# Patient Record
Sex: MALE
[De-identification: names, ages, dates, MRNs, and addresses within clinical notes are randomized per-mention and may not be internally consistent; named-entity substitution may affect disease eponyms.]

---

## 2024-07-25 ENCOUNTER — APPOINTMENT (OUTPATIENT)
Dept: ORTHOPEDIC SURGERY | Facility: CLINIC | Age: 59
End: 2024-07-25
Payer: COMMERCIAL

## 2024-07-25 VITALS — WEIGHT: 180 LBS | HEIGHT: 67 IN | BODY MASS INDEX: 28.25 KG/M2

## 2024-07-25 DIAGNOSIS — M75.32 CALCIFIC TENDINITIS OF LEFT SHOULDER: ICD-10-CM

## 2024-07-25 DIAGNOSIS — E11.9 TYPE 2 DIABETES MELLITUS W/OUT COMPLICATIONS: ICD-10-CM

## 2024-07-25 DIAGNOSIS — M79.18 MYALGIA, OTHER SITE: ICD-10-CM

## 2024-07-25 PROBLEM — Z00.00 ENCOUNTER FOR PREVENTIVE HEALTH EXAMINATION: Status: ACTIVE | Noted: 2024-07-25

## 2024-07-25 PROCEDURE — 20611 DRAIN/INJ JOINT/BURSA W/US: CPT | Mod: LT

## 2024-07-25 PROCEDURE — 72040 X-RAY EXAM NECK SPINE 2-3 VW: CPT

## 2024-07-25 PROCEDURE — 73010 X-RAY EXAM OF SHOULDER BLADE: CPT | Mod: LT

## 2024-07-25 PROCEDURE — 99204 OFFICE O/P NEW MOD 45 MIN: CPT | Mod: 25

## 2024-07-25 PROCEDURE — J3490M: CUSTOM

## 2024-07-25 PROCEDURE — 73030 X-RAY EXAM OF SHOULDER: CPT | Mod: LT

## 2024-07-25 RX ORDER — NAPROXEN 500 MG/1
500 TABLET ORAL TWICE DAILY
Qty: 30 | Refills: 0 | Status: ACTIVE | OUTPATIENT
Start: 2024-07-25

## 2024-07-25 NOTE — HISTORY OF PRESENT ILLNESS
[de-identified] : 58 year old male  (VENICE,  at [facility])  chronic neck into left shoulder pain since 2016 worsening since 2024 The pain is located anterior, lateral shoulder and side of neck into arm The pain is associated with radiating, numbness, tingling, clicking   Worse with activity and better at rest. Has tried ibuprofen, activity mod PMHX DM
DC instructions

## 2024-07-25 NOTE — ASSESSMENT
[FreeTextEntry1] : Left X-Ray Examination of the SHOULDER (2 views):  no fractures, subluxations or dislocations. Calcific density seen X-Ray Examination of the SCAPULA 1 or 2 views shows: no significant abnormalities X-Ray Examination of the CERVICAL SPINE 3 views (or less) shows: straightening consistent with spasm and disc space narrowing.    Cerv Disc Disease with Radic / Radiation into upper extremity, pos spurling on exam and weakness  - Pertinent aspects of the natural history of calcific tendonitis were reviewed with the patient.  I also reviewed with the patient that this condition is often associated with diabetes mellitus and thyroid disorders. - We discussed their diagnosis and treatment options at length including the risks and benefits of both surgical and non-surgical options. Surgical risks include but are not limited to pain, infection, bleeding, vascular injury, numbness, tingling, nerve damage. - Due to risks of surgery, they will continue conservative treatment with PT and anti-inflammatory medications. - naprosyn rx - Patient was given a prescription for an anti-inflammatory medication.  They will take it for the next week and then on an as needed basis, as long as there are no medical contra-indications.  Patient is counseled on possible GI, renal, and cardiovascular side effects. - We also discussed the possible of a Left shoulder corticosteroid injection in order to help decrease inflammation and pain so that they can perform better therapy and they wished to proceed with this treatment course. - MRI to rule out HNP and other causes of cervical radiculopathy - We also discussed risks and possibility of epidural injection by one of our pain mgmt doctors in the future - Follow up with pain management after mri

## 2024-07-25 NOTE — IMAGING
[de-identified] : NECK: Inspection: no ecchymosis.  Palpation: trapezial tenderness.  Range of motion:  Full range of motion with mild stiffness . Pain at extremes of rotation to left.  Strength Testing: Weakness with Left Finger Abductors and Grasp Normal Deltoid, Biceps, Triceps, Wrist Flexors Neurological testing: light touch is intact throughout both upper extremities Gonzalez reflex: neg Spurling test: positive   LEFT SHOULDER Inspection: No swelling.  Palpation: Tenderness is noted at the bicipital groove, anterior and lateral.  Range of motion: There is pain with range of motion. , ER 55, @90ER 90, @90IR 30 Strength: There is pain and discomfort with strength testing. Forward Flexion 4/5. Abduction 4/5.  External Rotation 5-/5 and Internal Rotation 5/5  Neurological testings: motor and sensor intact distally. Ligament Stability and Special Tests:  There is positive arc of pain.  Shoulder apprehension: neg Shoulder relocation: neg Obriens test: pos Biceps Active test: neg Bates Labral Shear: neg Impingement testing: pos Beatriz testing: pos Whipple: pos Cross Body Adduction: neg

## 2024-07-26 ENCOUNTER — APPOINTMENT (OUTPATIENT)
Dept: MRI IMAGING | Facility: CLINIC | Age: 59
End: 2024-07-26
Payer: COMMERCIAL

## 2024-07-26 PROCEDURE — 72141 MRI NECK SPINE W/O DYE: CPT

## 2024-08-01 PROBLEM — M50.90 CERVICAL DISC DISEASE: Status: ACTIVE | Noted: 2024-07-25

## 2024-08-01 PROBLEM — M54.12 CERVICAL RADICULOPATHY: Status: ACTIVE | Noted: 2024-07-25

## 2024-08-01 NOTE — IMAGING
[de-identified] : NECK: Inspection: no ecchymosis.  Palpation: trapezial tenderness.  Range of motion:  Full range of motion with mild stiffness . Pain at extremes of rotation to left.  Strength Testing: Weakness with Left Finger Abductors and Grasp Normal Deltoid, Biceps, Triceps, Wrist Flexors Neurological testing: light touch is intact throughout both upper extremities Gonzalez reflex: neg Spurling test: positive   LEFT SHOULDER Inspection: No swelling.  Palpation: Tenderness is noted at the bicipital groove, anterior and lateral.  Range of motion: There is pain with range of motion. , ER 55, @90ER 90, @90IR 30 Strength: There is pain and discomfort with strength testing. Forward Flexion 4/5. Abduction 4/5.  External Rotation 5-/5 and Internal Rotation 5/5  Neurological testings: motor and sensor intact distally. Ligament Stability and Special Tests:  There is positive arc of pain.  Shoulder apprehension: neg Shoulder relocation: neg Obriens test: pos Biceps Active test: neg Bates Labral Shear: neg Impingement testing: pos Beatriz testing: pos Whipple: pos Cross Body Adduction: neg

## 2024-08-01 NOTE — ASSESSMENT
[FreeTextEntry1] :  mri c spine 7/26/24 - multilevel DD with mult protrusions and stenosis and L disc extrusion c5-6 with severe stenosis   Cerv Disc Disease with Radic / Radiation into upper extremity,   - We discussed their diagnosis and treatment options at length including the risks and benefits of both surgical and non-surgical options. Surgical risks include but are not limited to pain, infection, bleeding, vascular injury, numbness, tingling, nerve damage. - Due to risks of surgery, they will continue conservative treatment with PT and anti-inflammatory medications. - The patient was provided with a prescription for Physical Therapy. - We also discussed risks and possibility of epidural injection by one of our pain mgmt doctors in the future - discussed may eventually need surgery if cont weakness and radiation  - Follow up with spine team    Medication Discussion: 1) We discussed a comprehensive treatment plan that included possible pharmaceutical management involving the use of prescription strength medications including but not limited to options such as oral Naprosyn 500mg BID, once daily Meloxicam 15 mg, or 500-650 mg Tylenol versus over the counter oral medications in addition to discussing possible topical prescription Pennsaid vs  Voltaren gel. 2) There is a moderate risk of morbidity with further treatment, especially from use of prescription strength medications and possible side effects of these medications which include but are not limited to upset stomach with oral medications, skin reactions to topical medications and GI/cardiac/renal issues with long term use. 3) I recommended that the patient follow-up with their medical physician if there are any significant potential issues with long term medication use such as interactions with current medications or with exacerbation of underlying medical comorbidities. 4) The benefits and risks associated with use of oral and / or topical prescription and over the counter anti-inflammatory medications were discussed with the patient. The patient voiced understanding of the risks including but not limited to bleeding, stroke, kidney dysfunction, heart disease, and were referred to the black box warning label for further information.

## 2024-08-01 NOTE — HISTORY OF PRESENT ILLNESS
[de-identified] : 58 year old male  (RHD,  at [facility])  chronic neck into left shoulder pain since 2016 worsening since 2024 The pain is located anterior, lateral shoulder and side of neck into arm The pain is associated with radiating, numbness, tingling, clicking   Worse with activity and better at rest. Has tried ibuprofen, activity mod PMHX DM  8/5/24 - had L shoulder CSI only mild relief, cont pain neck into left arm, had mri c spine

## 2024-08-05 ENCOUNTER — APPOINTMENT (OUTPATIENT)
Dept: ORTHOPEDIC SURGERY | Facility: CLINIC | Age: 59
End: 2024-08-05

## 2024-08-05 PROCEDURE — 99214 OFFICE O/P EST MOD 30 MIN: CPT

## 2024-08-05 NOTE — HISTORY OF PRESENT ILLNESS
[de-identified] : 58 year old male  (RHD,  at [facility])  chronic neck into left shoulder pain since 2016 worsening since 2024 The pain is located anterior, lateral shoulder and side of neck into arm The pain is associated with radiating, numbness, tingling, clicking   Worse with activity and better at rest. Has tried ibuprofen, activity mod PMHX DM  8/5/24 - had L shoulder CSI only mild relief, cont pain neck into left arm, had mri c spine

## 2024-08-05 NOTE — IMAGING
[de-identified] : NECK: Inspection: no ecchymosis.  Palpation: trapezial tenderness.  Range of motion:  Full range of motion with mild stiffness . Pain at extremes of rotation to left.  Strength Testing: Weakness with Left Finger Abductors and Grasp Normal Deltoid, Biceps, Triceps, Wrist Flexors Neurological testing: light touch is intact throughout both upper extremities Gonzalez reflex: neg Spurling test: positive   LEFT SHOULDER Inspection: No swelling.  Palpation: Tenderness is noted at the bicipital groove, anterior and lateral.  Range of motion: There is pain with range of motion. , ER 55, @90ER 90, @90IR 30 Strength: There is pain and discomfort with strength testing. Forward Flexion 4/5. Abduction 4/5.  External Rotation 5-/5 and Internal Rotation 5/5  Neurological testings: motor and sensor intact distally. Ligament Stability and Special Tests:  There is positive arc of pain.  Shoulder apprehension: neg Shoulder relocation: neg Obriens test: pos Biceps Active test: neg Bates Labral Shear: neg Impingement testing: pos Beatriz testing: pos Whipple: pos Cross Body Adduction: neg

## 2024-08-28 ENCOUNTER — APPOINTMENT (OUTPATIENT)
Dept: PAIN MANAGEMENT | Facility: CLINIC | Age: 59
End: 2024-08-28

## 2025-01-14 ENCOUNTER — NON-APPOINTMENT (OUTPATIENT)
Age: 60
End: 2025-01-14

## 2025-03-08 ENCOUNTER — APPOINTMENT (OUTPATIENT)
Dept: ULTRASOUND IMAGING | Facility: CLINIC | Age: 60
End: 2025-03-08
Payer: COMMERCIAL

## 2025-03-08 ENCOUNTER — OUTPATIENT (OUTPATIENT)
Dept: OUTPATIENT SERVICES | Facility: HOSPITAL | Age: 60
LOS: 1 days | End: 2025-03-08
Payer: COMMERCIAL

## 2025-03-08 DIAGNOSIS — Z00.00 ENCOUNTER FOR GENERAL ADULT MEDICAL EXAMINATION WITHOUT ABNORMAL FINDINGS: ICD-10-CM

## 2025-03-08 PROCEDURE — 76770 US EXAM ABDO BACK WALL COMP: CPT

## 2025-03-08 PROCEDURE — 76770 US EXAM ABDO BACK WALL COMP: CPT | Mod: 26

## 2025-07-07 ENCOUNTER — DOCTOR'S OFFICE (OUTPATIENT)
Facility: LOCATION | Age: 60
Setting detail: OPHTHALMOLOGY
End: 2025-07-07
Payer: COMMERCIAL

## 2025-07-07 DIAGNOSIS — E11.3293: ICD-10-CM

## 2025-07-07 DIAGNOSIS — H52.4: ICD-10-CM

## 2025-07-07 DIAGNOSIS — H35.372: ICD-10-CM

## 2025-07-07 DIAGNOSIS — H25.13: ICD-10-CM

## 2025-07-07 DIAGNOSIS — H34.8322: ICD-10-CM

## 2025-07-07 DIAGNOSIS — H40.013: ICD-10-CM

## 2025-07-07 PROCEDURE — 92015 DETERMINE REFRACTIVE STATE: CPT | Performed by: OPHTHALMOLOGY

## 2025-07-07 PROCEDURE — 92004 COMPRE OPH EXAM NEW PT 1/>: CPT | Performed by: OPHTHALMOLOGY

## 2025-07-07 PROCEDURE — 92250 FUNDUS PHOTOGRAPHY W/I&R: CPT | Performed by: OPHTHALMOLOGY

## 2025-07-07 ASSESSMENT — REFRACTION_CURRENTRX
OS_OVR_VA: 20/
OD_OVR_VA: 20/
OS_VPRISM_DIRECTION: SV
OS_SPHERE: +1.25
OD_VPRISM_DIRECTION: SV
OD_SPHERE: +1.25

## 2025-07-07 ASSESSMENT — LID EXAM ASSESSMENTS
OS_MEIBOMITIS: 1+
OD_MEIBOMITIS: 1+

## 2025-07-07 ASSESSMENT — CONFRONTATIONAL VISUAL FIELD TEST (CVF)
OS_FINDINGS: FULL
OD_FINDINGS: FULL

## 2025-07-07 ASSESSMENT — REFRACTION_MANIFEST
OS_VA2: 20/20
OS_CYLINDER: -1.00
OD_SPHERE: +2.00
OD_CYLINDER: -0.75
OU_VA: 20/20
OD_VA1: 20/20-1
OS_ADD: +2.00
OD_VA2: 20/20
OS_VA1: 20/20-1
OD_ADD: +2.00
OD_AXIS: 090
OS_SPHERE: +1.50
OS_AXIS: 070

## 2025-07-07 ASSESSMENT — VISUAL ACUITY
OS_BCVA: 20/40-1
OD_BCVA: 20/30-2+2

## 2025-07-07 ASSESSMENT — REFRACTION_AUTOREFRACTION
OS_AXIS: 073
OS_CYLINDER: -1.00
OD_AXIS: 090
OD_SPHERE: +1.50
OS_SPHERE: +1.50
OD_CYLINDER: -0.75

## 2025-07-07 ASSESSMENT — KERATOMETRY
OD_K2POWER_DIOPTERS: 46.00
OS_K2POWER_DIOPTERS: 46.50
OS_AXISANGLE_DEGREES: 111
OD_AXISANGLE_DEGREES: 061
OD_K1POWER_DIOPTERS: 45.50
OS_K1POWER_DIOPTERS: 46.25

## 2025-07-10 ENCOUNTER — DOCTOR'S OFFICE (OUTPATIENT)
Facility: LOCATION | Age: 60
Setting detail: OPHTHALMOLOGY
End: 2025-07-10
Payer: COMMERCIAL

## 2025-07-10 DIAGNOSIS — H34.8322: ICD-10-CM

## 2025-07-10 DIAGNOSIS — E11.3293: ICD-10-CM

## 2025-07-10 DIAGNOSIS — H43.393: ICD-10-CM

## 2025-07-10 DIAGNOSIS — H35.372: ICD-10-CM

## 2025-07-10 PROBLEM — H25.13 CATARACT SENILE NUCLEAR SCLEROSIS; BOTH EYES: Status: ACTIVE | Noted: 2025-07-07

## 2025-07-10 PROBLEM — H40.013 GLAUCOMA SUSPECT, LOW RISK 1-2 FACTORS; BOTH EYES: Status: ACTIVE | Noted: 2025-07-07

## 2025-07-10 PROCEDURE — 92012 INTRM OPH EXAM EST PATIENT: CPT | Performed by: OPHTHALMOLOGY

## 2025-07-10 PROCEDURE — 92201 OPSCPY EXTND RTA DRAW UNI/BI: CPT | Performed by: OPHTHALMOLOGY

## 2025-07-10 PROCEDURE — 92134 CPTRZ OPH DX IMG PST SGM RTA: CPT | Performed by: OPHTHALMOLOGY

## 2025-07-10 ASSESSMENT — REFRACTION_AUTOREFRACTION
OS_AXIS: 073
OS_SPHERE: +1.50
OD_SPHERE: +1.50
OD_CYLINDER: -0.75
OS_CYLINDER: -1.00
OD_AXIS: 090

## 2025-07-10 ASSESSMENT — KERATOMETRY
OS_K2POWER_DIOPTERS: 46.50
OD_K1POWER_DIOPTERS: 45.50
OS_K1POWER_DIOPTERS: 46.25
OD_K2POWER_DIOPTERS: 46.00
OS_AXISANGLE_DEGREES: 111
OD_AXISANGLE_DEGREES: 061

## 2025-07-10 ASSESSMENT — VISUAL ACUITY
OS_BCVA: 20/30-2
OD_BCVA: 20/30-2

## 2025-07-10 ASSESSMENT — CONFRONTATIONAL VISUAL FIELD TEST (CVF)
OS_FINDINGS: FULL
OD_FINDINGS: FULL

## 2025-07-10 ASSESSMENT — LID EXAM ASSESSMENTS
OD_MEIBOMITIS: 1+
OS_MEIBOMITIS: 1+

## 2025-07-10 ASSESSMENT — TONOMETRY
OS_IOP_MMHG: 15
OD_IOP_MMHG: 17

## 2025-08-26 ENCOUNTER — DOCTOR'S OFFICE (OUTPATIENT)
Facility: LOCATION | Age: 60
Setting detail: OPHTHALMOLOGY
End: 2025-08-26
Payer: COMMERCIAL

## 2025-08-26 DIAGNOSIS — E11.3293: ICD-10-CM

## 2025-08-26 DIAGNOSIS — H35.372: ICD-10-CM

## 2025-08-26 DIAGNOSIS — H43.393: ICD-10-CM

## 2025-08-26 DIAGNOSIS — H34.8322: ICD-10-CM

## 2025-08-26 PROCEDURE — 92235 FLUORESCEIN ANGRPH MLTIFRAME: CPT | Performed by: OPHTHALMOLOGY

## 2025-08-26 PROCEDURE — 92134 CPTRZ OPH DX IMG PST SGM RTA: CPT | Performed by: OPHTHALMOLOGY

## 2025-08-26 PROCEDURE — 92012 INTRM OPH EXAM EST PATIENT: CPT | Performed by: OPHTHALMOLOGY

## 2025-08-26 ASSESSMENT — KERATOMETRY
OD_K2POWER_DIOPTERS: 46.00
OD_K1POWER_DIOPTERS: 45.50
OS_AXISANGLE_DEGREES: 111
OS_K2POWER_DIOPTERS: 46.50
OS_K1POWER_DIOPTERS: 46.25
OD_AXISANGLE_DEGREES: 061

## 2025-08-26 ASSESSMENT — VISUAL ACUITY
OD_BCVA: 20/25-1
OS_BCVA: 20/25

## 2025-08-26 ASSESSMENT — REFRACTION_AUTOREFRACTION
OD_SPHERE: +1.50
OS_CYLINDER: -1.00
OD_AXIS: 090
OD_CYLINDER: -0.75
OS_SPHERE: +1.50
OS_AXIS: 073

## 2025-08-26 ASSESSMENT — LID EXAM ASSESSMENTS
OS_MEIBOMITIS: 1+
OD_MEIBOMITIS: 1+